# Patient Record
Sex: FEMALE | Race: WHITE | NOT HISPANIC OR LATINO | Employment: FULL TIME | URBAN - METROPOLITAN AREA
[De-identification: names, ages, dates, MRNs, and addresses within clinical notes are randomized per-mention and may not be internally consistent; named-entity substitution may affect disease eponyms.]

---

## 2018-04-10 ENCOUNTER — RECORDS - HEALTHEAST (OUTPATIENT)
Dept: LAB | Facility: CLINIC | Age: 53
End: 2018-04-10

## 2018-04-10 LAB
ANION GAP SERPL CALCULATED.3IONS-SCNC: 8 MMOL/L (ref 5–18)
BUN SERPL-MCNC: 9 MG/DL (ref 8–22)
CALCIUM SERPL-MCNC: 8.4 MG/DL (ref 8.5–10.5)
CHLORIDE BLD-SCNC: 104 MMOL/L (ref 98–107)
CO2 SERPL-SCNC: 24 MMOL/L (ref 22–31)
CREAT SERPL-MCNC: 0.99 MG/DL (ref 0.6–1.1)
GFR SERPL CREATININE-BSD FRML MDRD: 59 ML/MIN/1.73M2
GLUCOSE BLD-MCNC: 86 MG/DL (ref 70–125)
POTASSIUM BLD-SCNC: 4.1 MMOL/L (ref 3.5–5)
SODIUM SERPL-SCNC: 136 MMOL/L (ref 136–145)
TSH SERPL DL<=0.005 MIU/L-ACNC: 21.2 UIU/ML (ref 0.3–5)

## 2018-08-03 ENCOUNTER — RECORDS - HEALTHEAST (OUTPATIENT)
Dept: LAB | Facility: CLINIC | Age: 53
End: 2018-08-03

## 2018-08-03 LAB
FERRITIN SERPL-MCNC: 5 NG/ML (ref 10–130)
TSH SERPL DL<=0.005 MIU/L-ACNC: 15.67 UIU/ML (ref 0.3–5)

## 2018-09-27 ENCOUNTER — RECORDS - HEALTHEAST (OUTPATIENT)
Dept: LAB | Facility: CLINIC | Age: 53
End: 2018-09-27

## 2018-09-27 LAB
ANION GAP SERPL CALCULATED.3IONS-SCNC: 11 MMOL/L (ref 5–18)
BUN SERPL-MCNC: 8 MG/DL (ref 8–22)
CALCIUM SERPL-MCNC: 9 MG/DL (ref 8.5–10.5)
CHLORIDE BLD-SCNC: 106 MMOL/L (ref 98–107)
CHOLEST SERPL-MCNC: 192 MG/DL
CO2 SERPL-SCNC: 20 MMOL/L (ref 22–31)
CREAT SERPL-MCNC: 1.03 MG/DL (ref 0.6–1.1)
FASTING STATUS PATIENT QL REPORTED: YES
FERRITIN SERPL-MCNC: 12 NG/ML (ref 10–130)
GFR SERPL CREATININE-BSD FRML MDRD: 56 ML/MIN/1.73M2
GLUCOSE BLD-MCNC: 85 MG/DL (ref 70–125)
HDLC SERPL-MCNC: 90 MG/DL
LDLC SERPL CALC-MCNC: 85 MG/DL
POTASSIUM BLD-SCNC: 4.2 MMOL/L (ref 3.5–5)
SODIUM SERPL-SCNC: 137 MMOL/L (ref 136–145)
TRIGL SERPL-MCNC: 83 MG/DL
TSH SERPL DL<=0.005 MIU/L-ACNC: 0.06 UIU/ML (ref 0.3–5)

## 2018-11-27 ENCOUNTER — RECORDS - HEALTHEAST (OUTPATIENT)
Dept: LAB | Facility: CLINIC | Age: 53
End: 2018-11-27

## 2018-11-27 LAB
ANION GAP SERPL CALCULATED.3IONS-SCNC: 10 MMOL/L (ref 5–18)
BUN SERPL-MCNC: 11 MG/DL (ref 8–22)
CALCIUM SERPL-MCNC: 9.5 MG/DL (ref 8.5–10.5)
CHLORIDE BLD-SCNC: 104 MMOL/L (ref 98–107)
CO2 SERPL-SCNC: 25 MMOL/L (ref 22–31)
CREAT SERPL-MCNC: 1.1 MG/DL (ref 0.6–1.1)
FERRITIN SERPL-MCNC: 8 NG/ML (ref 10–130)
GFR SERPL CREATININE-BSD FRML MDRD: 52 ML/MIN/1.73M2
GLUCOSE BLD-MCNC: 87 MG/DL (ref 70–125)
POTASSIUM BLD-SCNC: 4.7 MMOL/L (ref 3.5–5)
SODIUM SERPL-SCNC: 139 MMOL/L (ref 136–145)
TSH SERPL DL<=0.005 MIU/L-ACNC: 0.17 UIU/ML (ref 0.3–5)

## 2019-03-06 ENCOUNTER — AMBULATORY - HEALTHEAST (OUTPATIENT)
Dept: CARDIOLOGY | Facility: CLINIC | Age: 54
End: 2019-03-06

## 2019-03-07 ENCOUNTER — AMBULATORY - HEALTHEAST (OUTPATIENT)
Dept: CARDIOLOGY | Facility: CLINIC | Age: 54
End: 2019-03-07

## 2019-03-07 ENCOUNTER — RECORDS - HEALTHEAST (OUTPATIENT)
Dept: ADMINISTRATIVE | Facility: OTHER | Age: 54
End: 2019-03-07

## 2019-03-11 ENCOUNTER — OFFICE VISIT - HEALTHEAST (OUTPATIENT)
Dept: CARDIOLOGY | Facility: CLINIC | Age: 54
End: 2019-03-11

## 2019-03-11 ENCOUNTER — HOSPITAL ENCOUNTER (OUTPATIENT)
Dept: CARDIOLOGY | Facility: HOSPITAL | Age: 54
Discharge: HOME | End: 2019-03-11
Attending: INTERNAL MEDICINE

## 2019-03-11 DIAGNOSIS — R07.89 ATYPICAL CHEST PAIN: ICD-10-CM

## 2019-03-11 DIAGNOSIS — F50.9 EATING DISORDER: ICD-10-CM

## 2019-03-11 DIAGNOSIS — I10 ESSENTIAL HYPERTENSION: ICD-10-CM

## 2019-03-11 DIAGNOSIS — E66.01 MORBID OBESITY (H): ICD-10-CM

## 2019-03-11 ASSESSMENT — MIFFLIN-ST. JEOR: SCORE: 1254.06

## 2019-03-12 LAB
BSA FOR ECHO PROCEDURE: 1.71 M2
CV BLOOD PRESSURE: NORMAL MMHG
CV ECHO HEIGHT: 67 IN
CV ECHO WEIGHT: 137 LBS
CV STRESS CURRENT BP HE: NORMAL
CV STRESS CURRENT HR HE: 100
CV STRESS CURRENT HR HE: 115
CV STRESS CURRENT HR HE: 123
CV STRESS CURRENT HR HE: 123
CV STRESS CURRENT HR HE: 124
CV STRESS CURRENT HR HE: 124
CV STRESS CURRENT HR HE: 125
CV STRESS CURRENT HR HE: 131
CV STRESS CURRENT HR HE: 133
CV STRESS CURRENT HR HE: 138
CV STRESS CURRENT HR HE: 141
CV STRESS CURRENT HR HE: 63
CV STRESS CURRENT HR HE: 69
CV STRESS CURRENT HR HE: 71
CV STRESS CURRENT HR HE: 72
CV STRESS CURRENT HR HE: 73
CV STRESS CURRENT HR HE: 75
CV STRESS CURRENT HR HE: 87
CV STRESS CURRENT HR HE: 88
CV STRESS CURRENT HR HE: 94
CV STRESS CURRENT HR HE: 99
CV STRESS DEVIATION TIME HE: NORMAL
CV STRESS ECHO PERCENT HR HE: NORMAL
CV STRESS EXERCISE STAGE HE: NORMAL
CV STRESS FINAL RESTING BP HE: NORMAL
CV STRESS FINAL RESTING HR HE: 71
CV STRESS MAX HR HE: 147
CV STRESS MAX TREADMILL GRADE HE: 14
CV STRESS MAX TREADMILL SPEED HE: 3.4
CV STRESS PEAK DIA BP HE: NORMAL
CV STRESS PEAK SYS BP HE: NORMAL
CV STRESS PHASE HE: NORMAL
CV STRESS PROTOCOL HE: NORMAL
CV STRESS RESTING PT POSITION HE: NORMAL
CV STRESS RESTING PT POSITION HE: NORMAL
CV STRESS ST DEVIATION AMOUNT HE: NORMAL
CV STRESS ST DEVIATION ELEVATION HE: NORMAL
CV STRESS ST EVELATION AMOUNT HE: NORMAL
CV STRESS TEST TYPE HE: NORMAL
CV STRESS TOTAL STAGE TIME MIN 1 HE: NORMAL
LEFT VENTRICLE HEART RATE: 70 BPM
NUC REST DIASTOLIC VOLUME INDEX: 2192 LBS
NUC REST SYSTOLIC VOLUME INDEX: 67 IN
STRESS ECHO BASELINE BP: NORMAL
STRESS ECHO BASELINE HR: 62
STRESS ECHO CALCULATED PERCENT HR: 88 %
STRESS ECHO LAST STRESS BP: NORMAL
STRESS ECHO LAST STRESS HR: 141
STRESS ECHO POST ESTIMATED WORKLOAD: 7.7
STRESS ECHO POST EXERCISE DUR MIN: 6
STRESS ECHO POST EXERCISE DUR SEC: 20
STRESS ECHO TARGET HR: 143

## 2019-04-18 ENCOUNTER — RECORDS - HEALTHEAST (OUTPATIENT)
Dept: LAB | Facility: CLINIC | Age: 54
End: 2019-04-18

## 2019-04-18 LAB
ANION GAP SERPL CALCULATED.3IONS-SCNC: 10 MMOL/L (ref 5–18)
BUN SERPL-MCNC: 11 MG/DL (ref 8–22)
CALCIUM SERPL-MCNC: 9 MG/DL (ref 8.5–10.5)
CHLORIDE BLD-SCNC: 103 MMOL/L (ref 98–107)
CO2 SERPL-SCNC: 22 MMOL/L (ref 22–31)
CREAT SERPL-MCNC: 1.22 MG/DL (ref 0.6–1.1)
GFR SERPL CREATININE-BSD FRML MDRD: 46 ML/MIN/1.73M2
GLUCOSE BLD-MCNC: 85 MG/DL (ref 70–125)
POTASSIUM BLD-SCNC: 4.5 MMOL/L (ref 3.5–5)
SODIUM SERPL-SCNC: 135 MMOL/L (ref 136–145)
TSH SERPL DL<=0.005 MIU/L-ACNC: 0.27 UIU/ML (ref 0.3–5)

## 2019-06-20 ENCOUNTER — RECORDS - HEALTHEAST (OUTPATIENT)
Dept: LAB | Facility: CLINIC | Age: 54
End: 2019-06-20

## 2019-06-20 LAB
FERRITIN SERPL-MCNC: 541 NG/ML (ref 10–130)
TSH SERPL DL<=0.005 MIU/L-ACNC: 0.02 UIU/ML (ref 0.3–5)

## 2019-06-21 LAB — 25(OH)D3 SERPL-MCNC: 21.7 NG/ML (ref 30–80)

## 2019-12-05 ENCOUNTER — RECORDS - HEALTHEAST (OUTPATIENT)
Dept: LAB | Facility: CLINIC | Age: 54
End: 2019-12-05

## 2019-12-05 LAB
ANION GAP SERPL CALCULATED.3IONS-SCNC: 6 MMOL/L (ref 5–18)
BUN SERPL-MCNC: 12 MG/DL (ref 8–22)
CALCIUM SERPL-MCNC: 8.9 MG/DL (ref 8.5–10.5)
CHLORIDE BLD-SCNC: 106 MMOL/L (ref 98–107)
CO2 SERPL-SCNC: 25 MMOL/L (ref 22–31)
CREAT SERPL-MCNC: 1.16 MG/DL (ref 0.6–1.1)
GFR SERPL CREATININE-BSD FRML MDRD: 49 ML/MIN/1.73M2
GLUCOSE BLD-MCNC: 83 MG/DL (ref 70–125)
POTASSIUM BLD-SCNC: 5 MMOL/L (ref 3.5–5)
SODIUM SERPL-SCNC: 137 MMOL/L (ref 136–145)
TSH SERPL DL<=0.005 MIU/L-ACNC: 2.03 UIU/ML (ref 0.3–5)

## 2019-12-23 ENCOUNTER — RECORDS - HEALTHEAST (OUTPATIENT)
Dept: LAB | Facility: CLINIC | Age: 54
End: 2019-12-23

## 2019-12-23 LAB — VIT B12 SERPL-MCNC: 293 PG/ML (ref 213–816)

## 2020-07-07 ENCOUNTER — RECORDS - HEALTHEAST (OUTPATIENT)
Dept: LAB | Facility: CLINIC | Age: 55
End: 2020-07-07

## 2020-07-07 LAB
ANION GAP SERPL CALCULATED.3IONS-SCNC: 9 MMOL/L (ref 5–18)
BUN SERPL-MCNC: 14 MG/DL (ref 8–22)
CALCIUM SERPL-MCNC: 8.8 MG/DL (ref 8.5–10.5)
CHLORIDE BLD-SCNC: 106 MMOL/L (ref 98–107)
CO2 SERPL-SCNC: 22 MMOL/L (ref 22–31)
CREAT SERPL-MCNC: 0.96 MG/DL (ref 0.6–1.1)
GFR SERPL CREATININE-BSD FRML MDRD: >60 ML/MIN/1.73M2
GLUCOSE BLD-MCNC: 98 MG/DL (ref 70–125)
POTASSIUM BLD-SCNC: 4.3 MMOL/L (ref 3.5–5)
SODIUM SERPL-SCNC: 137 MMOL/L (ref 136–145)
T4 FREE SERPL-MCNC: 0.6 NG/DL (ref 0.7–1.8)
TSH SERPL DL<=0.005 MIU/L-ACNC: 21.68 UIU/ML (ref 0.3–5)

## 2020-07-08 LAB — BACTERIA SPEC CULT: NO GROWTH

## 2020-09-04 ENCOUNTER — RECORDS - HEALTHEAST (OUTPATIENT)
Dept: LAB | Facility: CLINIC | Age: 55
End: 2020-09-04

## 2020-09-04 LAB — TSH SERPL DL<=0.005 MIU/L-ACNC: 0.35 UIU/ML (ref 0.3–5)

## 2021-02-04 ENCOUNTER — RECORDS - HEALTHEAST (OUTPATIENT)
Dept: LAB | Facility: CLINIC | Age: 56
End: 2021-02-04

## 2021-02-04 LAB
ANION GAP SERPL CALCULATED.3IONS-SCNC: 10 MMOL/L (ref 5–18)
BUN SERPL-MCNC: 12 MG/DL (ref 8–22)
CALCIUM SERPL-MCNC: 8.4 MG/DL (ref 8.5–10.5)
CHLORIDE BLD-SCNC: 107 MMOL/L (ref 98–107)
CO2 SERPL-SCNC: 20 MMOL/L (ref 22–31)
CREAT SERPL-MCNC: 1.38 MG/DL (ref 0.6–1.1)
ERYTHROCYTE [SEDIMENTATION RATE] IN BLOOD BY WESTERGREN METHOD: 23 MM/HR (ref 0–20)
GFR SERPL CREATININE-BSD FRML MDRD: 40 ML/MIN/1.73M2
GLUCOSE BLD-MCNC: 79 MG/DL (ref 70–125)
POTASSIUM BLD-SCNC: 4.4 MMOL/L (ref 3.5–5)
RHEUMATOID FACT SERPL-ACNC: <15 IU/ML (ref 0–30)
SODIUM SERPL-SCNC: 137 MMOL/L (ref 136–145)
TSH SERPL DL<=0.005 MIU/L-ACNC: 1.52 UIU/ML (ref 0.3–5)

## 2021-05-25 ENCOUNTER — RECORDS - HEALTHEAST (OUTPATIENT)
Dept: ADMINISTRATIVE | Facility: CLINIC | Age: 56
End: 2021-05-25

## 2021-05-26 ENCOUNTER — RECORDS - HEALTHEAST (OUTPATIENT)
Dept: ADMINISTRATIVE | Facility: CLINIC | Age: 56
End: 2021-05-26

## 2021-05-27 ENCOUNTER — RECORDS - HEALTHEAST (OUTPATIENT)
Dept: ADMINISTRATIVE | Facility: CLINIC | Age: 56
End: 2021-05-27

## 2021-05-28 ENCOUNTER — RECORDS - HEALTHEAST (OUTPATIENT)
Dept: ADMINISTRATIVE | Facility: CLINIC | Age: 56
End: 2021-05-28

## 2021-05-29 ENCOUNTER — RECORDS - HEALTHEAST (OUTPATIENT)
Dept: ADMINISTRATIVE | Facility: CLINIC | Age: 56
End: 2021-05-29

## 2021-05-30 ENCOUNTER — RECORDS - HEALTHEAST (OUTPATIENT)
Dept: ADMINISTRATIVE | Facility: CLINIC | Age: 56
End: 2021-05-30

## 2021-05-31 ENCOUNTER — RECORDS - HEALTHEAST (OUTPATIENT)
Dept: ADMINISTRATIVE | Facility: CLINIC | Age: 56
End: 2021-05-31

## 2021-06-01 ENCOUNTER — RECORDS - HEALTHEAST (OUTPATIENT)
Dept: ADMINISTRATIVE | Facility: CLINIC | Age: 56
End: 2021-06-01

## 2021-06-02 ENCOUNTER — RECORDS - HEALTHEAST (OUTPATIENT)
Dept: ADMINISTRATIVE | Facility: CLINIC | Age: 56
End: 2021-06-02

## 2021-06-02 VITALS — WEIGHT: 137 LBS | HEIGHT: 67 IN | BODY MASS INDEX: 21.5 KG/M2

## 2021-06-13 ENCOUNTER — HEALTH MAINTENANCE LETTER (OUTPATIENT)
Age: 56
End: 2021-06-13

## 2021-06-24 NOTE — PATIENT INSTRUCTIONS - HE
Below is a list of instructions we discussed today in clinic:   1. Taking a baby aspirin 81 mg daily may be of benefit for your heart  2. Schedule an exercise stress echocardiogram  3. Follow up pending abnormal results.    You should receive a phone call from this office informing you of test or procedure results within 3 business days of the test being performed.  If you do not hear from our office with the test results within 1 week please do not hesitate to call asking for these results.     It was a pleasure to meet with you today in clinic.  Please do not hesitate to call the [facility] clinic with any questions or concerns at [phone].    Sincerely,     Evaristo Valle MD  Non-invasive Cardiology  [facility]

## 2021-06-27 NOTE — PROGRESS NOTES
Progress Notes by Evaristo Valle MD at 3/11/2019  8:50 AM     Author: Evaristo Valle MD Service: -- Author Type: Physician    Filed: 3/11/2019  9:50 AM Encounter Date: 3/11/2019 Status: Addendum    : Evaristo Valle MD (Physician)    Related Notes: Original Note by Evaristo Valle MD (Physician) filed at 3/11/2019  9:13 AM           Click to link to [facility]     [facility] RAPID ACCESS CLINIC NOTE    Thank you, Silva Monte MD, for asking the [facility] team to see Ms. Emily Goldsmith to evaluate Consult (chest pain).      Assessment/Recommendations   Assessment:    1. Atypical chest pain - has some risk factors for coronary artery disease.   2. Hypertension - labile.   3. History of eating disorder and morbid obesity with prior Criss-en Y gastric bypass surgery - likely increase her risk of cardiac disease.    Plan:  1. Exercise stress echo  2. Follow up pending abnormal results.           History of Present Illness   Ms. Emily Goldsmith is a 53 y.o. female with a significant past history of an eating disorder, opiate chemical dependency, chronic pain syndrome, GERD, hypertension and epilepsy who presents for rapid access cardiology referral for chest pain.     On 3/5/19 she presented to the ER with complaints of chest pain. The pain was described as squeezing and was reproducible with palpation of the chest wall and was relieved with toradol. There were no associated elevations in troponin or ECG changes. Onset of pain was while at work and described as a squeezing pain with some radiation down her left arm. Not exertional. Was constant and moderate intensity for 2 hours prior to presenting to the ER. Chest pain was associated with flushed/hot feeling, mild sweating, and anxiety. No associated shortness of breath, palpitations or palpitations. ER evaluation was overall fairly unremarkable.    She has had intermittent fluttering in her chest for  most of her life.     Since her ER visit she notes that she had another episode of discomfort a few days later. Lasted all night and resolved the next morning. She is not very active in general. Does not get regular exercise. Has a moderate level of stress in her job.    Other than noted above, Ms. Goldsmith denies any chest pain/pressure/tightness, shortness of breath at rest or with exertion, light headedness/dizziness, pre-syncope, syncope, lower extremity swelling, palpitations, paroxysmal nocturnal dyspnea (PND), or orthopnea.     Cardiac Problems and Cardiac Diagnostics     Most Recent Cardiac testing:  ECG dated 3/5/19 (personaly reviewed and interpreted): normal sinus rhythm. Normal ECG.    Echo from 1/31/08 at [facility] was essentially normal. LVEF 55-65%.     Medications  Allergies   Current Outpatient Medications   Medication Sig Dispense Refill   ? amLODIPine (NORVASC) 2.5 MG tablet Take 2.5 mg by mouth daily.     ? FLUoxetine (PROZAC) 20 MG capsule Take 80 mg by mouth daily.     ? folic acid (FOLVITE) 1 MG tablet Take 1 mg by mouth daily.     ? levothyroxine (SYNTHROID, LEVOTHROID) 112 MCG tablet Take 112 mcg by mouth daily.     ? omeprazole (PRILOSEC) 40 MG capsule Take 40 mg by mouth daily.     ? rizatriptan (MAXALT) 5 MG tablet Take 5 mg by mouth as needed for migraine. May repeat in 2 hours if needed up to 30 mg in 24 hours and max use of 5 days/month     ? albuterol (PROVENTIL HFA) 90 mcg/actuation inhaler Inhale 1-2 puffs every 4 (four) hours as needed for wheezing.       No current facility-administered medications for this visit.       Allergies   Allergen Reactions   ? Naprosyn [Naproxen] Other (See Comments)     Chest pain     ? Propranolol Diarrhea   ? Zanaflex [Tizanidine] Other (See Comments)     Slurred speech, ataxia, tremors   ? Duloxetine Diarrhea     Tired and sleepy   ? Influenza Virus Vaccines    ? Morphine Other (See Comments)     Get constipation   ? Sumatriptan Other (See Comments)      Neck and shoulder pain   ? Ultram [Tramadol] Other (See Comments)     Grand mal seizure possible related to ultram   ? Zolpidem Other (See Comments)     Sleepwalking & sleep eating   ? Diphenhydramine Anxiety     Restless, Irritation, Jumpy   ? Prochlorperazine Anxiety     Restless, irritation, jumpy        Physical Examination Review of Systems   Vitals:    03/11/19 0839   BP: 138/88   Pulse: 70   Resp: 18     Body mass index is 21.46 kg/m .  Wt Readings from Last 3 Encounters:   03/11/19 137 lb (62.1 kg)   03/05/19 135 lb (61.2 kg)   01/18/19 138 lb (62.6 kg)       General Appearance:   Pleasant female, appears older than stated age. no acute distress, thin body habitus   ENT/Mouth: membranes moist, no apparent gingival bleeding.      EYES:  no scleral icterus, normal conjunctivae   Neck: no carotid bruits. No anterior cervical lymphadenopaty   Respiratory:   lungs are clear to auscultation, no rales or wheezing, equal chest wall expansion    Cardiovascular:   Regular rhythm, normal rate. Normal first and second heart sounds with no murmurs, rubs, or gallops; the carotid, radial and posterior tibial pulses are intact, Jugular venous pressure normal, no edema bilaterally    Abdomen/GI:  Soft, non-tender   Extremities: no cyanosis or clubbing   Skin: no xanthelasma, warm.    Heme/lymph/ Immunology No apparent bleeding noted.   Neurologic: Alert and oriented. normal gait, no tremors     Psychiatric: Pleasant, calm, appropriate affect.    A complete 10 system review of systems was performed and is negative except as mentioned in the HPI or below:  General: WNL  Eyes: WNL  Ears/Nose/Throat: WNL  Lungs: Cough, Shortness of Breath, Wheezing  Heart: Chest Pain, Arm Pain(Palpitations)  Stomach: WNL  Bladder: WNL  Muscle/Joints: WNL  Skin: WNL  Nervous System: Dizziness  Mental Health: Depression, Anxiety     Blood: Easy Bruising       Past History   Past Medical History:   Past Medical History:   Diagnosis Date   ? Anxiety     ? Chemical dependency (H)     restricted   ? Eating disorder    ? Fibromyalgia    ? History of anesthesia complications 1992    nausea   ? Hypertension    ? Hypothyroidism    ? Kidney stone    ? Migraine    ? Pancreatic divisum    ? PONV (postoperative nausea and vomiting)        Past Surgical History:   Past Surgical History:   Procedure Laterality Date   ? CYSTOSCOPY W/ URETERAL STENT PLACEMENT Bilateral 10/30/2014    Procedure: CYSTOSCOPY BILATERAL STENT INSERTION;  Surgeon: Jessica Hwang MD;  Location: [facility];  Service:    ? CYSTOSCOPY W/ URETERAL STENT PLACEMENT Bilateral 2014    Procedure: CYSTOSCOPY BILATERAL URETEROSCOPY STONE EXTRACTION BILATERAL STENT EXCHANGE;  Surgeon: Jessica Hwang MD;  Location: [facility];  Service:    ? HERNIA REPAIR      upper abdominal hernia   ? HYSTERECTOMY  2016   ? [place] ERCP W/BIOPSY SINGLE/MULTIPLE     ? HENRY-EN-Y PROCEDURE     ? URETEROSCOPY         Family History:   Family History   Problem Relation Age of Onset   ? Gout Mother    grandmother  in her sleep.   Grandfather had atrial fibrillation    Social History:   Social History     Socioeconomic History   ? Marital status:      Spouse name: Not on file   ? Number of children: Not on file   ? Years of education: Not on file   ? Highest education level: Not on file   Occupational History   ? Occupation: call assistant     Employer: [employer]   Social Needs   ? Financial resource strain: Not on file   ? Food insecurity:     Worry: Not on file     Inability: Not on file   ? Transportation needs:     Medical: Not on file     Non-medical: Not on file   Tobacco Use   ? Smoking status: Former Smoker     Types: Cigarettes   ? Smokeless tobacco: Never Used   ? Tobacco comment: Pt. states she quit 25-30 years ago.   Substance and Sexual Activity   ? Alcohol use: Yes     Comment: 4 drinks per week   ? Drug use: No   ? Sexual activity: Not on file   Lifestyle   ? Physical  activity:     Days per week: Not on file     Minutes per session: Not on file   ? Stress: Not on file   Relationships   ? Social connections:     Talks on phone: Not on file     Gets together: Not on file     Attends Gnosticism service: Not on file     Active member of club or organization: Not on file     Attends meetings of clubs or organizations: Not on file     Relationship status: Not on file   ? Intimate partner violence:     Fear of current or ex partner: Not on file     Emotionally abused: Not on file     Physically abused: Not on file     Forced sexual activity: Not on file   Other Topics Concern   ? Not on file   Social History Narrative   ? Not on file              Lab Results    Chemistry/lipid CBC Cardiac Enzymes/BNP/TSH/INR   Lab Results   Component Value Date    CHOL 192 09/27/2018    HDL 90 09/27/2018    LDLCALC 85 09/27/2018    TRIG 83 09/27/2018    CREATININE 1.16 (H) 03/05/2019    BUN 7 (L) 03/05/2019    K 4.0 03/05/2019     03/05/2019     03/05/2019    CO2 22 03/05/2019    Lab Results   Component Value Date    WBC 9.9 03/05/2019    HGB 15.1 03/05/2019    HCT 47.7 (H) 03/05/2019    MCV 86 03/05/2019     03/05/2019    Lab Results   Component Value Date    TROPONINI 0.02 03/05/2019    TSH 0.17 (L) 11/27/2018    INR 1.04 08/02/2010          Evaristo Valle MD  Non-invasive Cardiology  [facility]

## 2021-06-29 ENCOUNTER — RECORDS - HEALTHEAST (OUTPATIENT)
Dept: LAB | Facility: CLINIC | Age: 56
End: 2021-06-29

## 2021-06-29 LAB
T4 FREE SERPL-MCNC: 0.8 NG/DL (ref 0.7–1.8)
TSH SERPL DL<=0.005 MIU/L-ACNC: 11.68 UIU/ML (ref 0.3–5)

## 2021-09-03 ENCOUNTER — LAB REQUISITION (OUTPATIENT)
Dept: LAB | Facility: CLINIC | Age: 56
End: 2021-09-03

## 2021-09-03 DIAGNOSIS — E03.9 HYPOTHYROIDISM, UNSPECIFIED: ICD-10-CM

## 2021-09-03 LAB — TSH SERPL DL<=0.005 MIU/L-ACNC: 1.48 UIU/ML (ref 0.3–5)

## 2021-09-03 PROCEDURE — 84443 ASSAY THYROID STIM HORMONE: CPT | Performed by: FAMILY MEDICINE

## 2021-10-03 ENCOUNTER — HEALTH MAINTENANCE LETTER (OUTPATIENT)
Age: 56
End: 2021-10-03

## 2022-02-07 ENCOUNTER — LAB REQUISITION (OUTPATIENT)
Dept: LAB | Facility: CLINIC | Age: 57
End: 2022-02-07

## 2022-02-07 DIAGNOSIS — J02.9 ACUTE PHARYNGITIS, UNSPECIFIED: ICD-10-CM

## 2022-02-07 PROCEDURE — 87081 CULTURE SCREEN ONLY: CPT | Performed by: FAMILY MEDICINE

## 2022-02-10 LAB — BACTERIA SPEC CULT: NORMAL

## 2022-02-17 ENCOUNTER — LAB REQUISITION (OUTPATIENT)
Dept: LAB | Facility: CLINIC | Age: 57
End: 2022-02-17

## 2022-02-17 DIAGNOSIS — E03.9 HYPOTHYROIDISM, UNSPECIFIED: ICD-10-CM

## 2022-02-17 DIAGNOSIS — I10 ESSENTIAL (PRIMARY) HYPERTENSION: ICD-10-CM

## 2022-02-17 LAB
ANION GAP SERPL CALCULATED.3IONS-SCNC: 11 MMOL/L (ref 5–18)
BUN SERPL-MCNC: 10 MG/DL (ref 8–22)
CALCIUM SERPL-MCNC: 9.3 MG/DL (ref 8.5–10.5)
CHLORIDE BLD-SCNC: 105 MMOL/L (ref 98–107)
CO2 SERPL-SCNC: 20 MMOL/L (ref 22–31)
CREAT SERPL-MCNC: 1.29 MG/DL (ref 0.6–1.1)
GFR SERPL CREATININE-BSD FRML MDRD: 49 ML/MIN/1.73M2
GLUCOSE BLD-MCNC: 84 MG/DL (ref 70–125)
POTASSIUM BLD-SCNC: 4.7 MMOL/L (ref 3.5–5)
SODIUM SERPL-SCNC: 136 MMOL/L (ref 136–145)
TSH SERPL DL<=0.005 MIU/L-ACNC: 0.39 UIU/ML (ref 0.3–5)

## 2022-02-17 PROCEDURE — 84443 ASSAY THYROID STIM HORMONE: CPT | Performed by: FAMILY MEDICINE

## 2022-02-17 PROCEDURE — 80048 BASIC METABOLIC PNL TOTAL CA: CPT | Performed by: FAMILY MEDICINE

## 2022-03-11 ENCOUNTER — LAB REQUISITION (OUTPATIENT)
Dept: LAB | Facility: CLINIC | Age: 57
End: 2022-03-11

## 2022-03-11 DIAGNOSIS — R19.7 DIARRHEA, UNSPECIFIED: ICD-10-CM

## 2022-03-11 PROCEDURE — 87209 SMEAR COMPLEX STAIN: CPT | Performed by: FAMILY MEDICINE

## 2022-03-11 PROCEDURE — 87493 C DIFF AMPLIFIED PROBE: CPT | Performed by: FAMILY MEDICINE

## 2022-03-11 PROCEDURE — 87506 IADNA-DNA/RNA PROBE TQ 6-11: CPT | Performed by: FAMILY MEDICINE

## 2022-03-12 LAB — C DIFF TOX B STL QL: NEGATIVE

## 2022-03-14 LAB — O+P STL MICRO: NEGATIVE

## 2022-07-10 ENCOUNTER — HEALTH MAINTENANCE LETTER (OUTPATIENT)
Age: 57
End: 2022-07-10

## 2022-08-25 ENCOUNTER — LAB REQUISITION (OUTPATIENT)
Dept: LAB | Facility: CLINIC | Age: 57
End: 2022-08-25

## 2022-08-25 DIAGNOSIS — R19.7 DIARRHEA, UNSPECIFIED: ICD-10-CM

## 2022-08-25 PROCEDURE — 83516 IMMUNOASSAY NONANTIBODY: CPT | Performed by: FAMILY MEDICINE

## 2022-08-25 PROCEDURE — 82784 ASSAY IGA/IGD/IGG/IGM EACH: CPT | Performed by: FAMILY MEDICINE

## 2022-08-26 ENCOUNTER — LAB REQUISITION (OUTPATIENT)
Dept: LAB | Facility: CLINIC | Age: 57
End: 2022-08-26

## 2022-08-26 DIAGNOSIS — R19.7 DIARRHEA, UNSPECIFIED: ICD-10-CM

## 2022-08-26 LAB
C COLI+JEJUNI+LARI FUSA STL QL NAA+PROBE: NOT DETECTED
EC STX1 GENE STL QL NAA+PROBE: NOT DETECTED
EC STX2 GENE STL QL NAA+PROBE: NOT DETECTED
GLIADIN IGA SER-ACNC: 0.7 U/ML
GLIADIN IGG SER-ACNC: <0.6 U/ML
IGA SERPL-MCNC: 203 MG/DL (ref 84–499)
NOROV GI+II ORF1-ORF2 JNC STL QL NAA+PR: NOT DETECTED
RVA NSP5 STL QL NAA+PROBE: NOT DETECTED
SALMONELLA SP RPOD STL QL NAA+PROBE: NOT DETECTED
SHIGELLA SP+EIEC IPAH STL QL NAA+PROBE: NOT DETECTED
TTG IGA SER-ACNC: 0.4 U/ML
TTG IGG SER-ACNC: 0.7 U/ML
V CHOL+PARA RFBL+TRKH+TNAA STL QL NAA+PR: NOT DETECTED
Y ENTERO RECN STL QL NAA+PROBE: NOT DETECTED

## 2022-08-26 PROCEDURE — 87209 SMEAR COMPLEX STAIN: CPT | Performed by: FAMILY MEDICINE

## 2022-08-26 PROCEDURE — 87506 IADNA-DNA/RNA PROBE TQ 6-11: CPT | Performed by: FAMILY MEDICINE

## 2022-08-29 LAB — O+P STL MICRO: NEGATIVE

## 2022-09-10 ENCOUNTER — HEALTH MAINTENANCE LETTER (OUTPATIENT)
Age: 57
End: 2022-09-10

## 2023-02-06 ENCOUNTER — LAB REQUISITION (OUTPATIENT)
Dept: LAB | Facility: CLINIC | Age: 58
End: 2023-02-06

## 2023-02-06 DIAGNOSIS — I10 ESSENTIAL (PRIMARY) HYPERTENSION: ICD-10-CM

## 2023-02-06 DIAGNOSIS — E03.9 HYPOTHYROIDISM, UNSPECIFIED: ICD-10-CM

## 2023-02-06 DIAGNOSIS — E55.9 VITAMIN D DEFICIENCY, UNSPECIFIED: ICD-10-CM

## 2023-02-06 LAB
ANION GAP SERPL CALCULATED.3IONS-SCNC: 11 MMOL/L (ref 7–15)
BUN SERPL-MCNC: 21 MG/DL (ref 6–20)
CALCIUM SERPL-MCNC: 8.8 MG/DL (ref 8.6–10)
CHLORIDE SERPL-SCNC: 103 MMOL/L (ref 98–107)
CREAT SERPL-MCNC: 1.4 MG/DL (ref 0.51–0.95)
DEPRECATED HCO3 PLAS-SCNC: 22 MMOL/L (ref 22–29)
GFR SERPL CREATININE-BSD FRML MDRD: 44 ML/MIN/1.73M2
GLUCOSE SERPL-MCNC: 75 MG/DL (ref 70–99)
POTASSIUM SERPL-SCNC: 4.8 MMOL/L (ref 3.4–5.3)
SODIUM SERPL-SCNC: 136 MMOL/L (ref 136–145)
TSH SERPL DL<=0.005 MIU/L-ACNC: 0.65 UIU/ML (ref 0.3–4.2)

## 2023-02-06 PROCEDURE — 82306 VITAMIN D 25 HYDROXY: CPT | Performed by: FAMILY MEDICINE

## 2023-02-06 PROCEDURE — 84443 ASSAY THYROID STIM HORMONE: CPT | Performed by: FAMILY MEDICINE

## 2023-02-06 PROCEDURE — 80048 BASIC METABOLIC PNL TOTAL CA: CPT | Performed by: FAMILY MEDICINE

## 2023-02-07 LAB — DEPRECATED CALCIDIOL+CALCIFEROL SERPL-MC: 56 UG/L (ref 20–75)

## 2023-06-07 ENCOUNTER — LAB REQUISITION (OUTPATIENT)
Dept: LAB | Facility: CLINIC | Age: 58
End: 2023-06-07

## 2023-06-07 DIAGNOSIS — I10 ESSENTIAL (PRIMARY) HYPERTENSION: ICD-10-CM

## 2023-06-07 DIAGNOSIS — M25.50 PAIN IN UNSPECIFIED JOINT: ICD-10-CM

## 2023-06-07 LAB — ERYTHROCYTE [SEDIMENTATION RATE] IN BLOOD BY WESTERGREN METHOD: 20 MM/HR (ref 0–30)

## 2023-06-07 PROCEDURE — 86431 RHEUMATOID FACTOR QUANT: CPT | Performed by: FAMILY MEDICINE

## 2023-06-07 PROCEDURE — 85652 RBC SED RATE AUTOMATED: CPT | Performed by: FAMILY MEDICINE

## 2023-06-07 PROCEDURE — 82310 ASSAY OF CALCIUM: CPT | Performed by: FAMILY MEDICINE

## 2023-06-08 LAB
ANION GAP SERPL CALCULATED.3IONS-SCNC: 11 MMOL/L (ref 7–15)
BUN SERPL-MCNC: 16.6 MG/DL (ref 6–20)
CALCIUM SERPL-MCNC: 9.1 MG/DL (ref 8.6–10)
CHLORIDE SERPL-SCNC: 106 MMOL/L (ref 98–107)
CREAT SERPL-MCNC: 1.62 MG/DL (ref 0.51–0.95)
DEPRECATED HCO3 PLAS-SCNC: 21 MMOL/L (ref 22–29)
GFR SERPL CREATININE-BSD FRML MDRD: 37 ML/MIN/1.73M2
GLUCOSE SERPL-MCNC: 83 MG/DL (ref 70–99)
POTASSIUM SERPL-SCNC: 4.7 MMOL/L (ref 3.4–5.3)
SODIUM SERPL-SCNC: 138 MMOL/L (ref 136–145)

## 2023-06-09 LAB — RHEUMATOID FACT SER NEPH-ACNC: <7 IU/ML

## 2023-06-21 ENCOUNTER — HOSPITAL ENCOUNTER (EMERGENCY)
Facility: CLINIC | Age: 58
Discharge: HOME | End: 2023-06-21
Attending: EMERGENCY MEDICINE | Admitting: EMERGENCY MEDICINE
Payer: COMMERCIAL

## 2023-06-21 ENCOUNTER — APPOINTMENT (OUTPATIENT)
Dept: CT IMAGING | Facility: CLINIC | Age: 58
End: 2023-06-21
Attending: EMERGENCY MEDICINE
Payer: COMMERCIAL

## 2023-06-21 VITALS
SYSTOLIC BLOOD PRESSURE: 122 MMHG | TEMPERATURE: 98.1 F | DIASTOLIC BLOOD PRESSURE: 66 MMHG | OXYGEN SATURATION: 99 % | HEART RATE: 68 BPM

## 2023-06-21 DIAGNOSIS — R10.9 ABDOMINAL PAIN, UNSPECIFIED ABDOMINAL LOCATION: ICD-10-CM

## 2023-06-21 LAB
ABO/RH(D): NORMAL
ALBUMIN SERPL-MCNC: 4.1 G/DL (ref 3.5–5)
ALP SERPL-CCNC: 86 U/L (ref 45–120)
ALT SERPL W P-5'-P-CCNC: 17 U/L (ref 0–45)
ANION GAP SERPL CALCULATED.3IONS-SCNC: 6 MMOL/L (ref 5–18)
ANTIBODY SCREEN: NEGATIVE
APTT PPP: 29 SECONDS (ref 22–38)
AST SERPL W P-5'-P-CCNC: 25 U/L (ref 0–40)
BASOPHILS # BLD AUTO: 0.1 10E3/UL (ref 0–0.2)
BASOPHILS NFR BLD AUTO: 1 %
BILIRUB SERPL-MCNC: 0.5 MG/DL (ref 0–1)
BUN SERPL-MCNC: 12 MG/DL (ref 8–22)
CALCIUM SERPL-MCNC: 10.4 MG/DL (ref 8.5–10.5)
CHLORIDE BLD-SCNC: 105 MMOL/L (ref 98–107)
CO2 SERPL-SCNC: 28 MMOL/L (ref 22–31)
CREAT SERPL-MCNC: 1.51 MG/DL (ref 0.6–1.1)
EOSINOPHIL # BLD AUTO: 0.1 10E3/UL (ref 0–0.7)
EOSINOPHIL NFR BLD AUTO: 2 %
ERYTHROCYTE [DISTWIDTH] IN BLOOD BY AUTOMATED COUNT: 13.3 % (ref 10–15)
GFR SERPL CREATININE-BSD FRML MDRD: 40 ML/MIN/1.73M2
GLUCOSE BLD-MCNC: 107 MG/DL (ref 70–125)
HCT VFR BLD AUTO: 41.4 % (ref 35–47)
HGB BLD-MCNC: 13 G/DL (ref 11.7–15.7)
IMM GRANULOCYTES # BLD: 0 10E3/UL
IMM GRANULOCYTES NFR BLD: 0 %
INR PPP: 0.96 (ref 0.85–1.15)
LIPASE SERPL-CCNC: 36 U/L (ref 0–52)
LYMPHOCYTES # BLD AUTO: 2.6 10E3/UL (ref 0.8–5.3)
LYMPHOCYTES NFR BLD AUTO: 30 %
MCH RBC QN AUTO: 29.7 PG (ref 26.5–33)
MCHC RBC AUTO-ENTMCNC: 31.4 G/DL (ref 31.5–36.5)
MCV RBC AUTO: 95 FL (ref 78–100)
MONOCYTES # BLD AUTO: 0.8 10E3/UL (ref 0–1.3)
MONOCYTES NFR BLD AUTO: 9 %
NEUTROPHILS # BLD AUTO: 5.1 10E3/UL (ref 1.6–8.3)
NEUTROPHILS NFR BLD AUTO: 58 %
NRBC # BLD AUTO: 0 10E3/UL
NRBC BLD AUTO-RTO: 0 /100
PLATELET # BLD AUTO: 318 10E3/UL (ref 150–450)
POTASSIUM BLD-SCNC: 4.9 MMOL/L (ref 3.5–5)
PROT SERPL-MCNC: 7.5 G/DL (ref 6–8)
RBC # BLD AUTO: 4.37 10E6/UL (ref 3.8–5.2)
SODIUM SERPL-SCNC: 139 MMOL/L (ref 136–145)
SPECIMEN EXPIRATION DATE: NORMAL
WBC # BLD AUTO: 8.7 10E3/UL (ref 4–11)

## 2023-06-21 PROCEDURE — 36415 COLL VENOUS BLD VENIPUNCTURE: CPT | Performed by: EMERGENCY MEDICINE

## 2023-06-21 PROCEDURE — 96374 THER/PROPH/DIAG INJ IV PUSH: CPT | Mod: 59

## 2023-06-21 PROCEDURE — 74177 CT ABD & PELVIS W/CONTRAST: CPT

## 2023-06-21 PROCEDURE — 85730 THROMBOPLASTIN TIME PARTIAL: CPT | Performed by: EMERGENCY MEDICINE

## 2023-06-21 PROCEDURE — 96361 HYDRATE IV INFUSION ADD-ON: CPT

## 2023-06-21 PROCEDURE — 85610 PROTHROMBIN TIME: CPT | Performed by: EMERGENCY MEDICINE

## 2023-06-21 PROCEDURE — 99285 EMERGENCY DEPT VISIT HI MDM: CPT | Mod: 25

## 2023-06-21 PROCEDURE — 250N000011 HC RX IP 250 OP 636: Mod: JZ | Performed by: EMERGENCY MEDICINE

## 2023-06-21 PROCEDURE — 258N000003 HC RX IP 258 OP 636: Performed by: EMERGENCY MEDICINE

## 2023-06-21 PROCEDURE — 83690 ASSAY OF LIPASE: CPT | Performed by: EMERGENCY MEDICINE

## 2023-06-21 PROCEDURE — 80053 COMPREHEN METABOLIC PANEL: CPT | Performed by: EMERGENCY MEDICINE

## 2023-06-21 PROCEDURE — 86901 BLOOD TYPING SEROLOGIC RH(D): CPT | Performed by: EMERGENCY MEDICINE

## 2023-06-21 PROCEDURE — 86850 RBC ANTIBODY SCREEN: CPT | Performed by: EMERGENCY MEDICINE

## 2023-06-21 PROCEDURE — 96375 TX/PRO/DX INJ NEW DRUG ADDON: CPT

## 2023-06-21 PROCEDURE — 85025 COMPLETE CBC W/AUTO DIFF WBC: CPT | Performed by: EMERGENCY MEDICINE

## 2023-06-21 RX ORDER — ONDANSETRON 2 MG/ML
8 INJECTION INTRAMUSCULAR; INTRAVENOUS ONCE
Status: COMPLETED | OUTPATIENT
Start: 2023-06-21 | End: 2023-06-21

## 2023-06-21 RX ORDER — IOPAMIDOL 755 MG/ML
75 INJECTION, SOLUTION INTRAVASCULAR ONCE
Status: COMPLETED | OUTPATIENT
Start: 2023-06-21 | End: 2023-06-21

## 2023-06-21 RX ORDER — ONDANSETRON 4 MG/1
4-8 TABLET, ORALLY DISINTEGRATING ORAL EVERY 8 HOURS PRN
Qty: 20 TABLET | Refills: 0 | Status: SHIPPED | OUTPATIENT
Start: 2023-06-21 | End: 2023-06-24

## 2023-06-21 RX ORDER — FAMOTIDINE 20 MG/1
20 TABLET, FILM COATED ORAL 2 TIMES DAILY
Qty: 60 TABLET | Refills: 0 | Status: SHIPPED | OUTPATIENT
Start: 2023-06-21 | End: 2023-07-21

## 2023-06-21 RX ADMIN — ONDANSETRON 8 MG: 2 INJECTION INTRAMUSCULAR; INTRAVENOUS at 15:31

## 2023-06-21 RX ADMIN — SODIUM CHLORIDE 1000 ML: 9 INJECTION, SOLUTION INTRAVENOUS at 15:31

## 2023-06-21 RX ADMIN — FAMOTIDINE 20 MG: 10 INJECTION, SOLUTION INTRAVENOUS at 15:31

## 2023-06-21 RX ADMIN — IOPAMIDOL 75 ML: 755 INJECTION, SOLUTION INTRAVENOUS at 15:52

## 2023-06-21 ASSESSMENT — ACTIVITIES OF DAILY LIVING (ADL): ADLS_ACUITY_SCORE: 35

## 2023-06-21 NOTE — ED PROVIDER NOTES
EMERGENCY DEPARTMENT ENCOUNTER     NAME: Emily Goldsmith   AGE: 57 year old female   YOB: 1965   MRN: [id number]   EVALUATION DATE & TIME: 6/21/2023  3:07 PM   PCP: Silva Summers     Chief Complaint   Patient presents with     Abdominal Pain     Vomiting   :    FINAL IMPRESSION       1. Abdominal pain, unspecified abdominal location           ED COURSE & MEDICAL DECISION MAKING    3:09 PM I met with the patient to obtain patient history and performed a physical exam. Discussed plan for ED work up including potential diagnostic studies and interventions.  4:26 PM Reevaluated and updated the patient with findings. Patient states her symptoms are improved. We discussed the plan for discharge and the patient is agreeable. Reviewed supportive cares, symptomatic treatment, outpatient follow up, and reasons to return to the Emergency Department. Patient to be discharged by ED RN.     Pertinent Labs & Imaging studies reviewed. (See chart for details)   57 year old female  presents to the Emergency Department for evaluation of some abdominal discomfort and vomiting after drinking coffee more frequently. Initial Vitals Reviewed. Initial exam notable for generally well-appearing patient who is thin but with normal vital signs and a generally benign abdomen.  I suspect GERD versus gastritis versus something like gastroenteritis but considered dehydration, electrolyte abnormalities.  I have a very low suspicion for an acute surgical emergency, but we did do CT imaging which is fortunately unremarkable and reassuring.  I treated her with IV fluids, Pepcid and Zofran and she feels significantly improved.  I think this is probably some gastritis from drinking coffee, and patient plans to avoid this.  We are going to keep her on some outpatient Pepcid and Zofran, and she understands that if its not improving, the next step would be to see [facility] for potential endoscopy as she is  post gastric bypass and CT does not show an anastomotic site ulcer but other peptic ulcer disease is in the differential.  Her and spouse are comfortable with discharge.           At the conclusion of the encounter I discussed the results of all of the tests and the disposition. The questions were answered. The patient or family acknowledged understanding and was agreeable with the care plan.         Medical Decision Making    History:    Supplemental history from: Documented in chart, if applicable, spouse    External Record(s) reviewed: Outpatient Record: Renata  6/21/2023    Work Up:    Chart documentation includes differential considered and any EKGs or imaging independently interpreted by provider, where specified.    In additional to work up documented, I considered the following work up: Documented in chart, if applicable.    External consultation:    Discussion of management with another provider: Documented in chart, if applicable    Complicating factors:    Care impacted by chronic illness: Mental Health and Other: GERD, hypothyroidism, epilepsy    Care affected by social determinants of health: N/A    Disposition considerations: Discharge. I prescribed additional prescription strength medication(s) as charted. See documentation for any additional details.        MEDICATIONS GIVEN IN THE EMERGENCY:   Medications   0.9% sodium chloride BOLUS (1,000 mLs Intravenous $New Bag 6/21/23 1531)   ondansetron (ZOFRAN) injection 8 mg (8 mg Intravenous $Given 6/21/23 1531)   famotidine (PEPCID) injection 20 mg (20 mg Intravenous $Given 6/21/23 1531)   iopamidol (ISOVUE-370) solution 75 mL (75 mLs Intravenous $Given 6/21/23 1552)      NEW PRESCRIPTIONS STARTED AT TODAY'S ER VISIT   New Prescriptions    FAMOTIDINE (PEPCID) 20 MG TABLET    Take 1 tablet (20 mg) by mouth 2 times daily for 30 days    ONDANSETRON (ZOFRAN ODT) 4 MG ODT TAB    Take 1-2 tablets (4-8 mg) by mouth every 8 hours as needed for nausea      ================================================================   HISTORY OF PRESENT ILLNESS       Patient information was obtained from: patient   Use of Intrepreter: N/A   Emily Goldsmith is a 57 year old female with history of GERD, cholecystectomy, s/p gastric bypass delmar en y, gastric ulcers, anorexia nervosa, recurrent UTI, nephrolithiasis, hysterectomy, who presents abdominal pain and vomiting.    Patient reports yesterday morning, she was drinking coffee and immediately felt sudden onset nausea and vomiting. She says she forcibly made herself vomit and noticed the emesis was coffee brown color. Since then, the nausea has persisted into today. She also endorses generalized abdominal pain that radiates into her back starting at 5 AM this morning. She describes the pain as constant dull achy pain that progressively became cramping pain throughout today. She took 40 mg of Pepcid, tums, and tylenol without relief. She also notes she had lost 8 lbs since Monday (was 150 lbs then). Due to her complex medical history, she decided to go to  to be evaluated but the CT machine was not working so she decided to go to the ED.    She also endorses some dark stools recently but denies taking iron supplements or pepto bismol. She ate beef jerky last night but doesn't know if this effects her stool color.    There were no other concerns/complaints at this time.     ================================================================        PAST HISTORY     PAST MEDICAL HISTORY:   History reviewed. No pertinent past medical history.   PAST SURGICAL HISTORY:   Past Surgical History:   Procedure Laterality Date     COMBINED CYSTOSCOPY, INSERT STENT URETER(S) Bilateral 10/30/2014    Procedure: CYSTOSCOPY BILATERAL STENT INSERTION;  Surgeon: Jessica Hwang MD;  Location: [facility] OR;  Service:      COMBINED CYSTOSCOPY, INSERT STENT URETER(S) Bilateral 11/13/2014    Procedure: CYSTOSCOPY BILATERAL URETEROSCOPY STONE  EXTRACTION BILATERAL STENT EXCHANGE;  Surgeon: Jessica Hwang MD;  Location: [facility];  Service:      HERNIA REPAIR      upper abdominal hernia     HYSTERECTOMY  03/17/2016     [place] ERCP W/BIOPSY SINGLE/MULTIPLE       REVISION HENRY-EN-Y       URETEROSCOPY        CURRENT MEDICATIONS:   famotidine (PEPCID) 20 MG tablet  ondansetron (ZOFRAN ODT) 4 MG ODT tab  albuterol (PROVENTIL HFA) 90 mcg/actuation inhaler  amLODIPine (NORVASC) 2.5 MG tablet  FLUoxetine (PROZAC) 20 MG capsule  folic acid (FOLVITE) 1 MG tablet  levothyroxine (SYNTHROID, LEVOTHROID) 112 MCG tablet  omeprazole (PRILOSEC) 40 MG capsule  rizatriptan (MAXALT) 5 MG tablet      ALLERGIES:   Allergies   Allergen Reactions     Naprosyn [Naproxen] Other (See Comments)     Chest pain     Propranolol Diarrhea     Zanaflex [Tizanidine] Other (See Comments)     Slurred speech, ataxia, tremors     Amoxicillin GI Disturbance     Other reaction(s): GI Upset  diarrhea  diarrhea       Duloxetine Diarrhea     Tired and sleepy     Influenza Virus Vaccines [Influenza Vaccines] Unknown     Morphine Other (See Comments)     Get constipation     Pregabalin Unknown     Sumatriptan Other (See Comments)     Neck and shoulder pain     Ultram [Tramadol] Other (See Comments)     Grand mal seizure possible related to ultram     Zolpidem Other (See Comments)     Sleepwalking & sleep eating     Diphenhydramine Anxiety     Restless, Irritation, Jumpy     Prochlorperazine Anxiety     Restless, irritation, jumpy     Thimerosal Rash      FAMILY HISTORY:   Family History   Problem Relation Age of Onset     Gout Mother       SOCIAL HISTORY:   Social History     Socioeconomic History     Marital status:    Tobacco Use     Smoking status: Former     Types: Cigarettes     Smokeless tobacco: Never     Tobacco comments:     Pt. states she quit 25-30 years ago.   Substance and Sexual Activity     Alcohol use: Yes     Comment: Alcoholic Drinks/day: 4 drinks per week     Drug  use: No        VITALS  Patient Vitals for the past 24 hrs:   BP Temp Temp src Pulse SpO2   06/21/23 1504 139/83 98.1  F (36.7  C) Temporal 70 99 %        ================================================================    PHYSICAL EXAM     VITAL SIGNS: /83   Pulse 70   Temp 98.1  F (36.7  C) (Temporal)   SpO2 99%    Constitutional:  Awake, no acute distress   HENT:  Atraumatic, oropharynx without exudate or erythema, membranes moist  Lymph:  No adenopathy  Eyes: EOM intact, PERRL, no injection  Neck: Supple  Respiratory:  Clear to auscultation bilaterally, no wheezes or crackles   Cardiovascular:  Regular rate and rhythm, single S1 and S2   GI:  Soft, mild epigastric pain, nondistended, no rebound or guarding   Musculoskeletal:  Moves all extremities, no lower extremity edema, no deformities    Skin:  Warm, dry  Neurologic:  Alert and oriented x3, no focal deficits noted       ================================================================  LAB       All pertinent labs reviewed and interpreted.   Labs Ordered and Resulted from Time of ED Arrival to Time of ED Departure   COMPREHENSIVE METABOLIC PANEL - Abnormal       Result Value    Sodium 139      Potassium 4.9      Chloride 105      Carbon Dioxide (CO2) 28      Anion Gap 6      Urea Nitrogen 12      Creatinine 1.51 (*)     Calcium 10.4      Glucose 107      Alkaline Phosphatase 86      AST 25      ALT 17      Protein Total 7.5      Albumin 4.1      Bilirubin Total 0.5      GFR Estimate 40 (*)    CBC WITH PLATELETS AND DIFFERENTIAL - Abnormal    WBC Count 8.7      RBC Count 4.37      Hemoglobin 13.0      Hematocrit 41.4      MCV 95      MCH 29.7      MCHC 31.4 (*)     RDW 13.3      Platelet Count 318      % Neutrophils 58      % Lymphocytes 30      % Monocytes 9      % Eosinophils 2      % Basophils 1      % Immature Granulocytes 0      NRBCs per 100 WBC 0      Absolute Neutrophils 5.1      Absolute Lymphocytes 2.6      Absolute Monocytes 0.8       Absolute Eosinophils 0.1      Absolute Basophils 0.1      Absolute Immature Granulocytes 0.0      Absolute NRBCs 0.0     INR - Normal    INR 0.96     PARTIAL THROMBOPLASTIN TIME - Normal    aPTT 29     LIPASE - Normal    Lipase 36     TYPE AND SCREEN, ADULT    ABO/RH(D) A POS      Antibody Screen Negative      SPECIMEN EXPIRATION DATE [id number]     ABO/RH TYPE AND SCREEN        ===============================================================  RADIOLOGY       Reviewed all pertinent imaging. Please see official radiology report.   CT Abdomen Pelvis w Contrast   Final Result   IMPRESSION:    No acute findings or other explanation for symptoms.            ================================================================  EKG         I have independently reviewed and interpreted the EKG(s) documented above.     ================================================================  PROCEDURES         I, Cristina Beck, am serving as a scribe to document services personally performed by Dr. Cotter based on my observation and the provider's statements to me. I, Christelle Cotter MD attest that Cristina Beck is acting in a scribe capacity, has observed my performance of the services and has documented them in accordance with my direction.     Christelle Cotter M.D.   Emergency Medicine   [facility] EMERGENCY ROOM  [address] [id number] [phone]  Dept: [phone]      Christelle Cotter MD  06/21/23 2504

## 2023-06-21 NOTE — ED TRIAGE NOTES
pt states Hx of ulcers and thinks its acting up. Pt states has lost about 8 lbs in a fee days, co lower abdominal pain., Pt has been taking meds at home to help and doubled Prilosec dose. Pt states  Yesterday started vomiting coffee grounds, pt states had that before and was not a bleeding ulcer so thought it would be fine, but states as day is going on getting worse and worse. Pt states Hx anorexia. Pt states had dark stools yesterday but not today.

## 2023-06-21 NOTE — DISCHARGE INSTRUCTIONS
As we discussed, the CT scan looks normal and most likely there is some mild dehydration and some stomach irritation caused by drinking coffee.  We are going to keep you on acid reflux medicine and nausea medicine.  If not improving, the next step is to see a GI doctor for an endoscopy.

## 2023-07-23 ENCOUNTER — HEALTH MAINTENANCE LETTER (OUTPATIENT)
Age: 58
End: 2023-07-23

## 2023-07-26 ENCOUNTER — LAB REQUISITION (OUTPATIENT)
Dept: LAB | Facility: CLINIC | Age: 58
End: 2023-07-26

## 2023-07-26 DIAGNOSIS — R68.89 OTHER GENERAL SYMPTOMS AND SIGNS: ICD-10-CM

## 2023-07-26 PROCEDURE — 84443 ASSAY THYROID STIM HORMONE: CPT | Performed by: FAMILY MEDICINE

## 2023-07-27 LAB — TSH SERPL DL<=0.005 MIU/L-ACNC: 10 UIU/ML (ref 0.3–4.2)

## 2023-09-13 ENCOUNTER — LAB REQUISITION (OUTPATIENT)
Dept: LAB | Facility: CLINIC | Age: 58
End: 2023-09-13

## 2023-09-13 DIAGNOSIS — E03.9 HYPOTHYROIDISM, UNSPECIFIED: ICD-10-CM

## 2023-09-13 DIAGNOSIS — D50.9 IRON DEFICIENCY ANEMIA, UNSPECIFIED: ICD-10-CM

## 2023-09-13 LAB
FERRITIN SERPL-MCNC: 243 NG/ML (ref 11–328)
TSH SERPL DL<=0.005 MIU/L-ACNC: 0.13 UIU/ML (ref 0.3–4.2)

## 2023-09-13 PROCEDURE — 82728 ASSAY OF FERRITIN: CPT | Performed by: FAMILY MEDICINE

## 2023-09-13 PROCEDURE — 84443 ASSAY THYROID STIM HORMONE: CPT | Performed by: FAMILY MEDICINE

## 2023-10-31 ENCOUNTER — LAB REQUISITION (OUTPATIENT)
Dept: LAB | Facility: CLINIC | Age: 58
End: 2023-10-31

## 2023-10-31 DIAGNOSIS — E03.9 HYPOTHYROIDISM, UNSPECIFIED: ICD-10-CM

## 2023-10-31 LAB — TSH SERPL DL<=0.005 MIU/L-ACNC: 0.13 UIU/ML (ref 0.3–4.2)

## 2023-10-31 PROCEDURE — 82306 VITAMIN D 25 HYDROXY: CPT | Performed by: FAMILY MEDICINE

## 2023-10-31 PROCEDURE — 84443 ASSAY THYROID STIM HORMONE: CPT | Performed by: FAMILY MEDICINE

## 2023-10-31 PROCEDURE — 80048 BASIC METABOLIC PNL TOTAL CA: CPT | Performed by: FAMILY MEDICINE

## 2023-11-01 ENCOUNTER — LAB REQUISITION (OUTPATIENT)
Dept: LAB | Facility: CLINIC | Age: 58
End: 2023-11-01

## 2023-11-01 DIAGNOSIS — E55.9 VITAMIN D DEFICIENCY, UNSPECIFIED: ICD-10-CM

## 2023-11-01 DIAGNOSIS — E03.9 HYPOTHYROIDISM, UNSPECIFIED: ICD-10-CM

## 2023-11-01 LAB
ANION GAP SERPL CALCULATED.3IONS-SCNC: 15 MMOL/L (ref 7–15)
BUN SERPL-MCNC: 16.7 MG/DL (ref 6–20)
CALCIUM SERPL-MCNC: 9.4 MG/DL (ref 8.6–10)
CHLORIDE SERPL-SCNC: 101 MMOL/L (ref 98–107)
CREAT SERPL-MCNC: 1.43 MG/DL (ref 0.51–0.95)
DEPRECATED HCO3 PLAS-SCNC: 23 MMOL/L (ref 22–29)
EGFRCR SERPLBLD CKD-EPI 2021: 43 ML/MIN/1.73M2
GLUCOSE SERPL-MCNC: 96 MG/DL (ref 70–99)
POTASSIUM SERPL-SCNC: 4.6 MMOL/L (ref 3.4–5.3)
SODIUM SERPL-SCNC: 139 MMOL/L (ref 135–145)
VIT D+METAB SERPL-MCNC: 115 NG/ML (ref 20–50)

## 2024-01-04 ENCOUNTER — LAB REQUISITION (OUTPATIENT)
Dept: LAB | Facility: CLINIC | Age: 59
End: 2024-01-04

## 2024-01-04 DIAGNOSIS — E03.9 HYPOTHYROIDISM, UNSPECIFIED: ICD-10-CM

## 2024-01-04 PROCEDURE — 84443 ASSAY THYROID STIM HORMONE: CPT | Performed by: FAMILY MEDICINE

## 2024-01-05 LAB — TSH SERPL DL<=0.005 MIU/L-ACNC: 0.06 UIU/ML (ref 0.3–4.2)

## 2024-06-14 ENCOUNTER — LAB REQUISITION (OUTPATIENT)
Dept: LAB | Facility: CLINIC | Age: 59
End: 2024-06-14

## 2024-06-14 DIAGNOSIS — E03.9 HYPOTHYROIDISM, UNSPECIFIED: ICD-10-CM

## 2024-06-14 DIAGNOSIS — D50.9 IRON DEFICIENCY ANEMIA, UNSPECIFIED: ICD-10-CM

## 2024-06-14 LAB
T4 FREE SERPL-MCNC: 2.27 NG/DL (ref 0.9–1.7)
TSH SERPL DL<=0.005 MIU/L-ACNC: 0.09 UIU/ML (ref 0.3–4.2)
VIT D+METAB SERPL-MCNC: 77 NG/ML (ref 20–50)

## 2024-06-14 PROCEDURE — 84439 ASSAY OF FREE THYROXINE: CPT | Performed by: FAMILY MEDICINE

## 2024-06-14 PROCEDURE — 84443 ASSAY THYROID STIM HORMONE: CPT | Performed by: FAMILY MEDICINE

## 2024-06-14 PROCEDURE — 82306 VITAMIN D 25 HYDROXY: CPT | Performed by: FAMILY MEDICINE

## 2024-09-15 ENCOUNTER — HEALTH MAINTENANCE LETTER (OUTPATIENT)
Age: 59
End: 2024-09-15

## 2024-09-25 ENCOUNTER — LAB REQUISITION (OUTPATIENT)
Dept: LAB | Facility: CLINIC | Age: 59
End: 2024-09-25

## 2024-09-25 DIAGNOSIS — E53.8 DEFICIENCY OF OTHER SPECIFIED B GROUP VITAMINS: ICD-10-CM

## 2024-09-25 LAB
T4 FREE SERPL-MCNC: 1.24 NG/DL (ref 0.9–1.7)
TSH SERPL DL<=0.005 MIU/L-ACNC: 0.2 UIU/ML (ref 0.3–4.2)

## 2024-09-25 PROCEDURE — 84443 ASSAY THYROID STIM HORMONE: CPT | Performed by: FAMILY MEDICINE

## 2024-09-25 PROCEDURE — 84439 ASSAY OF FREE THYROXINE: CPT | Performed by: FAMILY MEDICINE

## 2024-12-03 ENCOUNTER — LAB REQUISITION (OUTPATIENT)
Dept: LAB | Facility: CLINIC | Age: 59
End: 2024-12-03

## 2024-12-03 DIAGNOSIS — R39.9 UNSPECIFIED SYMPTOMS AND SIGNS INVOLVING THE GENITOURINARY SYSTEM: ICD-10-CM

## 2024-12-03 PROCEDURE — 87086 URINE CULTURE/COLONY COUNT: CPT | Performed by: FAMILY MEDICINE

## 2024-12-04 LAB — BACTERIA UR CULT: NORMAL

## 2025-02-13 ENCOUNTER — LAB REQUISITION (OUTPATIENT)
Dept: LAB | Facility: CLINIC | Age: 60
End: 2025-02-13

## 2025-02-13 DIAGNOSIS — R53.83 OTHER FATIGUE: ICD-10-CM

## 2025-02-13 PROCEDURE — 82306 VITAMIN D 25 HYDROXY: CPT | Performed by: FAMILY MEDICINE

## 2025-02-13 PROCEDURE — 84443 ASSAY THYROID STIM HORMONE: CPT | Performed by: FAMILY MEDICINE

## 2025-02-13 PROCEDURE — 84439 ASSAY OF FREE THYROXINE: CPT | Performed by: FAMILY MEDICINE

## 2025-02-15 LAB
T4 FREE SERPL-MCNC: 1.47 NG/DL (ref 0.9–1.7)
TSH SERPL DL<=0.005 MIU/L-ACNC: 0.08 UIU/ML (ref 0.3–4.2)
VIT D+METAB SERPL-MCNC: 61 NG/ML (ref 20–50)

## 2025-04-18 ENCOUNTER — LAB REQUISITION (OUTPATIENT)
Dept: LAB | Facility: CLINIC | Age: 60
End: 2025-04-18

## 2025-04-18 DIAGNOSIS — R30.0 DYSURIA: ICD-10-CM

## 2025-04-18 DIAGNOSIS — N18.32 CHRONIC KIDNEY DISEASE, STAGE 3B (H): ICD-10-CM

## 2025-04-18 PROCEDURE — 84443 ASSAY THYROID STIM HORMONE: CPT | Performed by: FAMILY MEDICINE

## 2025-04-18 PROCEDURE — 82310 ASSAY OF CALCIUM: CPT | Performed by: FAMILY MEDICINE

## 2025-04-18 PROCEDURE — 84439 ASSAY OF FREE THYROXINE: CPT | Performed by: FAMILY MEDICINE

## 2025-04-18 PROCEDURE — 80048 BASIC METABOLIC PNL TOTAL CA: CPT | Performed by: FAMILY MEDICINE

## 2025-04-18 PROCEDURE — 87186 SC STD MICRODIL/AGAR DIL: CPT | Performed by: FAMILY MEDICINE

## 2025-04-19 LAB
ANION GAP SERPL CALCULATED.3IONS-SCNC: 12 MMOL/L (ref 7–15)
BUN SERPL-MCNC: 14.3 MG/DL (ref 6–20)
CALCIUM SERPL-MCNC: 9.1 MG/DL (ref 8.8–10.4)
CHLORIDE SERPL-SCNC: 103 MMOL/L (ref 98–107)
CREAT SERPL-MCNC: 1.37 MG/DL (ref 0.51–0.95)
EGFRCR SERPLBLD CKD-EPI 2021: 45 ML/MIN/1.73M2
GLUCOSE SERPL-MCNC: 77 MG/DL (ref 70–99)
HCO3 SERPL-SCNC: 22 MMOL/L (ref 22–29)
POTASSIUM SERPL-SCNC: 3.9 MMOL/L (ref 3.4–5.3)
SODIUM SERPL-SCNC: 137 MMOL/L (ref 135–145)
T4 FREE SERPL-MCNC: 1.36 NG/DL (ref 0.9–1.7)
TSH SERPL DL<=0.005 MIU/L-ACNC: 0.15 UIU/ML (ref 0.3–4.2)

## 2025-04-20 LAB — BACTERIA UR CULT: ABNORMAL

## 2025-08-09 ENCOUNTER — HEALTH MAINTENANCE LETTER (OUTPATIENT)
Age: 60
End: 2025-08-09